# Patient Record
Sex: MALE
[De-identification: names, ages, dates, MRNs, and addresses within clinical notes are randomized per-mention and may not be internally consistent; named-entity substitution may affect disease eponyms.]

---

## 2023-03-09 PROBLEM — Z00.00 ENCOUNTER FOR PREVENTIVE HEALTH EXAMINATION: Status: ACTIVE | Noted: 2023-03-09

## 2023-03-10 NOTE — DATA REVIEWED
[de-identified] : Narrative & Impression\par CLINICAL INDICATION: Preoperative evaluation\par  \par TECHNIQUE: High-resolution multi-detector three-dimensional CT of the cervical spine was performed without the administration of intravenous contrast, according to standard protocol. Multiplanar reformations were reviewed, including axial, sagittal, and coronal plane images.\par  \par COMPARISON: None\par  \par FINDINGS:\par  \par ALIGNMENT: Straightening, mild reversal of the lordosis.\par  \par VERTEBRAE: Normal vertebral body heights. No significant facet arthropathy.\par  \par DISCS: Spondylosis most pronounced at C5-6 followed by C6-7.\par  \par PARAVERTEBRAL SOFT TISSUES: Normal.\par  \par EVALUATION OF INDIVIDUAL LEVELS DEMONSTRATES: \par C2-3 through C4-5: Minimal bulging. No canal or foraminal narrowing.\par  \par C5-6: Disc osteophyte complex asymmetric to the left. Moderate canal stenosis with indentation of the left ventral cord. Moderate left greater than right foraminal narrowing.\par  \par C6-7: Small disc osteophyte complex with right paracentral and foraminal protrusion-type herniation. Mild to moderate canal stenosis with minimal indentation of the right ventral cord. Severe right foraminal narrowing. No left foraminal narrowing.\par  \par C7-T1: Partially calcified disc annulus. No canal or foraminal narrowing.\par  \par Electronic Signature: I personally reviewed the images and agree with this report. Final Report: Dictated by  and Signed by Attending Fred Hughes MD 3/7/2023 2:46 PM\par  \par IMPRESSION: \par  \par C5-6: Disc osteophyte complex asymmetric to the left. Moderate canal stenosis with indentation of the left ventral cord. Moderate left greater than right foraminal narrowing.\par  \par C6-7: Small disc osteophyte complex with right paracentral and foraminal protrusion-type herniation. Mild to moderate canal stenosis with minimal indentation of the right ventral cord. Severe right foraminal narrowing. No left foraminal narrowing.\par  \par All Reviewers List\par Chris Mckinnon MD on 3/7/2023 15:07 [de-identified] : cervical spine w/o contrast 2/24/2023 [de-identified] : cervical spine 3/6/2023

## 2023-03-14 ENCOUNTER — APPOINTMENT (OUTPATIENT)
Dept: SPINE | Facility: CLINIC | Age: 38
End: 2023-03-14

## 2023-03-15 ENCOUNTER — APPOINTMENT (OUTPATIENT)
Dept: NEUROSURGERY | Facility: CLINIC | Age: 38
End: 2023-03-15